# Patient Record
Sex: MALE | Race: WHITE | NOT HISPANIC OR LATINO | Employment: FULL TIME | URBAN - METROPOLITAN AREA
[De-identification: names, ages, dates, MRNs, and addresses within clinical notes are randomized per-mention and may not be internally consistent; named-entity substitution may affect disease eponyms.]

---

## 2017-03-01 ENCOUNTER — HOSPITAL ENCOUNTER (EMERGENCY)
Facility: HOSPITAL | Age: 27
Discharge: HOME | End: 2017-03-01
Attending: EMERGENCY MEDICINE
Payer: COMMERCIAL

## 2017-03-01 VITALS
OXYGEN SATURATION: 98 % | TEMPERATURE: 99 F | HEART RATE: 77 BPM | WEIGHT: 262.81 LBS | HEIGHT: 72 IN | DIASTOLIC BLOOD PRESSURE: 61 MMHG | RESPIRATION RATE: 18 BRPM | BODY MASS INDEX: 35.6 KG/M2 | SYSTOLIC BLOOD PRESSURE: 127 MMHG

## 2017-03-01 DIAGNOSIS — S00.33XA NASAL CONTUSION: Primary | ICD-10-CM

## 2017-03-01 DIAGNOSIS — S09.90XA HEAD TRAUMA: ICD-10-CM

## 2017-03-01 DIAGNOSIS — R11.0 NAUSEA: ICD-10-CM

## 2017-03-01 LAB
ALBUMIN SERPL BCP-MCNC: 4.2 G/DL
ALP SERPL-CCNC: 50 U/L
ALT SERPL W/O P-5'-P-CCNC: 38 U/L
AMPHET+METHAMPHET UR QL: NEGATIVE
ANION GAP SERPL CALC-SCNC: 12 MMOL/L
AST SERPL-CCNC: 24 U/L
BARBITURATES UR QL SCN>200 NG/ML: NEGATIVE
BASOPHILS # BLD AUTO: 0.03 K/UL
BASOPHILS NFR BLD: 0.3 %
BENZODIAZ UR QL SCN>200 NG/ML: NEGATIVE
BILIRUB SERPL-MCNC: 0.5 MG/DL
BILIRUB UR QL STRIP: NEGATIVE
BUN SERPL-MCNC: 16 MG/DL
BZE UR QL SCN: NEGATIVE
CALCIUM SERPL-MCNC: 9.4 MG/DL
CANNABINOIDS UR QL SCN: NEGATIVE
CHLORIDE SERPL-SCNC: 101 MMOL/L
CLARITY UR REFRACT.AUTO: CLEAR
CO2 SERPL-SCNC: 25 MMOL/L
COLOR UR AUTO: NORMAL
CREAT SERPL-MCNC: 1.2 MG/DL
CREAT UR-MCNC: 38.7 MG/DL
DIFFERENTIAL METHOD: ABNORMAL
EOSINOPHIL # BLD AUTO: 0.3 K/UL
EOSINOPHIL NFR BLD: 3.2 %
ERYTHROCYTE [DISTWIDTH] IN BLOOD BY AUTOMATED COUNT: 13.2 %
EST. GFR  (AFRICAN AMERICAN): >60 ML/MIN/1.73 M^2
EST. GFR  (NON AFRICAN AMERICAN): >60 ML/MIN/1.73 M^2
ETHANOL SERPL-MCNC: <10 MG/DL
GLUCOSE SERPL-MCNC: 95 MG/DL
GLUCOSE UR QL STRIP: NEGATIVE
HCT VFR BLD AUTO: 44.1 %
HGB BLD-MCNC: 15.5 G/DL
HGB UR QL STRIP: NEGATIVE
KETONES UR QL STRIP: NEGATIVE
LEUKOCYTE ESTERASE UR QL STRIP: NEGATIVE
LYMPHOCYTES # BLD AUTO: 2.4 K/UL
LYMPHOCYTES NFR BLD: 25.3 %
MCH RBC QN AUTO: 30.1 PG
MCHC RBC AUTO-ENTMCNC: 35.1 %
MCV RBC AUTO: 86 FL
METHADONE UR QL SCN>300 NG/ML: NEGATIVE
MONOCYTES # BLD AUTO: 1.3 K/UL
MONOCYTES NFR BLD: 13.2 %
NEUTROPHILS # BLD AUTO: 5.5 K/UL
NEUTROPHILS NFR BLD: 57.9 %
NITRITE UR QL STRIP: NEGATIVE
OPIATES UR QL SCN: NEGATIVE
PCP UR QL SCN>25 NG/ML: NEGATIVE
PH UR STRIP: 7 [PH] (ref 5–8)
PLATELET # BLD AUTO: 312 K/UL
PMV BLD AUTO: 10.3 FL
POTASSIUM SERPL-SCNC: 3.9 MMOL/L
PROT SERPL-MCNC: 7.8 G/DL
PROT UR QL STRIP: NEGATIVE
RBC # BLD AUTO: 5.15 M/UL
SODIUM SERPL-SCNC: 138 MMOL/L
SP GR UR STRIP: <=1.005 (ref 1–1.03)
TOXICOLOGY INFORMATION: NORMAL
URN SPEC COLLECT METH UR: NORMAL
UROBILINOGEN UR STRIP-ACNC: NEGATIVE EU/DL
WBC # BLD AUTO: 9.47 K/UL

## 2017-03-01 PROCEDURE — 96372 THER/PROPH/DIAG INJ SC/IM: CPT

## 2017-03-01 PROCEDURE — 81003 URINALYSIS AUTO W/O SCOPE: CPT

## 2017-03-01 PROCEDURE — 25000003 PHARM REV CODE 250: Performed by: EMERGENCY MEDICINE

## 2017-03-01 PROCEDURE — 80053 COMPREHEN METABOLIC PANEL: CPT

## 2017-03-01 PROCEDURE — 80320 DRUG SCREEN QUANTALCOHOLS: CPT

## 2017-03-01 PROCEDURE — 82570 ASSAY OF URINE CREATININE: CPT

## 2017-03-01 PROCEDURE — 63600175 PHARM REV CODE 636 W HCPCS: Performed by: EMERGENCY MEDICINE

## 2017-03-01 PROCEDURE — 85025 COMPLETE CBC W/AUTO DIFF WBC: CPT

## 2017-03-01 PROCEDURE — 99284 EMERGENCY DEPT VISIT MOD MDM: CPT | Mod: 25

## 2017-03-01 RX ORDER — ACETAMINOPHEN 325 MG/1
975 TABLET ORAL
Status: COMPLETED | OUTPATIENT
Start: 2017-03-01 | End: 2017-03-01

## 2017-03-01 RX ORDER — ONDANSETRON 4 MG/1
4 TABLET, FILM COATED ORAL EVERY 8 HOURS PRN
Qty: 12 TABLET | Refills: 0 | Status: SHIPPED | OUTPATIENT
Start: 2017-03-01

## 2017-03-01 RX ORDER — NAPROXEN 500 MG/1
500 TABLET ORAL 2 TIMES DAILY WITH MEALS
Qty: 20 TABLET | Refills: 0 | Status: SHIPPED | OUTPATIENT
Start: 2017-03-01

## 2017-03-01 RX ORDER — ONDANSETRON 2 MG/ML
8 INJECTION INTRAMUSCULAR; INTRAVENOUS
Status: COMPLETED | OUTPATIENT
Start: 2017-03-01 | End: 2017-03-01

## 2017-03-01 RX ADMIN — ONDANSETRON 8 MG: 2 INJECTION INTRAMUSCULAR; INTRAVENOUS at 10:03

## 2017-03-01 RX ADMIN — ACETAMINOPHEN 975 MG: 325 TABLET ORAL at 10:03

## 2017-03-01 NOTE — ED AVS SNAPSHOT
[facility]  [address] [address] [address]               Ashish Andrade   3/1/2017  9:18 PM   ED    Description:  Male : 1990   Department:  [facility]           Your Care was Coordinated By:     Provider Role From To    Praveen Machado Jr., MD Attending Provider 17 --      Reason for Visit     Fatigue     Nausea           Diagnoses this Visit        Comments    Nasal contusion    -  Primary     Head trauma         Nausea           ED Disposition     ED Disposition Condition Comment    Discharge  Regarding FACIAL/NASAL CONTUSIONS, I advised patient to: REST the injured area or use it less than usual; apply ICE to decrease swelling and pain and help prevent tissue damage; use COMPRESSION with an elastic bandage to support the area and decrease swe lling; ELEVATE injured body part above the level of the heart to help decrease pain and swelling; AVOID using massage or massage to acute injuries as it may slow healing of the area; AVOID drinking alcohol as it may slow healing of the injury; and avoid  stretching injured muscles. Advised patient to return to the emergency department or contact primary care provider if: having trouble moving injured area; notice tingling or numbness in or near the injured area; extremity below the bruise gets cold or tu rns pale; a new lump develops in the injured area; symptoms do not improve with treatment after 4 to 5 days; there is any questions or concerns about the condition or treatment plan.              To Do List           Follow-up Information     Follow up with Ana Greco NP. Schedule an appointment as soon as possible for a visit in 1 week.    Specialty:  Family Medicine    Contact information:    [address] [address] [address]  [phone]          Follow up with [facility].    Specialty:  Emergency Medicine    Why:  As needed, If symptoms worsen    Contact  information:    [address] [address] [id number] [phone]       These Medications        Disp Refills Start End    naproxen (NAPROSYN) 500 MG tablet 20 tablet 0 3/1/2017     Take 1 tablet (500 mg total) by mouth 2 (two) times daily with meals. - Oral    Pharmacy: [facility] [address] [place] [address] SO. Ph #: [phone]       ondansetron (ZOFRAN) 4 MG tablet 12 tablet 0 3/1/2017     Take 1 tablet (4 mg total) by mouth every 8 (eight) hours as needed. - Oral    Pharmacy: [place] [address] [facility] SO. Ph #: [phone]         [facility] On [facility]     [facility] On Call Nurse Care Line - 24/7 Assistance  Registered nurses in the [facility] provide clinical advisement, health education, appointment booking, and other advisory services.  Call for this free service at [phone].             Medications           Message regarding Medications     Verify the changes and/or additions to your medication regime listed below are the same as discussed with your clinician today.  If any of these changes or additions are incorrect, please notify your healthcare provider.        START taking these NEW medications        Refills    naproxen (NAPROSYN) 500 MG tablet 0    Sig: Take 1 tablet (500 mg total) by mouth 2 (two) times daily with meals.    Class: Print    Route: Oral    ondansetron (ZOFRAN) 4 MG tablet 0    Sig: Take 1 tablet (4 mg total) by mouth every 8 (eight) hours as needed.    Class: Print    Route: Oral      These medications were administered today        Dose Freq    ondansetron injection 8 mg 8 mg ED 1 Time    Sig: Inject 8 mg into the vein ED 1 Time.    Class: Normal    Route: Intravenous    acetaminophen tablet 975 mg 975 mg ED 1 Time    Sig: Take 3 tablets (975 mg total) by mouth ED 1 Time.    Class: Normal    Route: Oral           Verify that the below list of medications is an accurate representation of the medications you  are currently taking.  If none reported, the list may be blank. If incorrect, please contact your healthcare provider. Carry this list with you in case of emergency.           Current Medications     olmesartan (BENICAR) 20 MG tablet Take 1 tablet (20 mg total) by mouth once daily.    naproxen (NAPROSYN) 500 MG tablet Take 1 tablet (500 mg total) by mouth 2 (two) times daily with meals.    ondansetron (ZOFRAN) 4 MG tablet Take 1 tablet (4 mg total) by mouth every 8 (eight) hours as needed.           Clinical Reference Information           Your Vitals Were     BP                   140/79 (BP Location: Left arm, Patient Position: Sitting)           Allergies as of 3/1/2017     No Known Allergies      Immunizations Administered on Date of Encounter - 3/1/2017     None      ED Micro, Lab, POCT     Start Ordered       Status Ordering Provider    03/01/17 2148 03/01/17 2147  Ethanol  STAT      Final result     03/01/17 2125 03/01/17 2124    STAT,   Status:  Canceled      Canceled     03/01/17 2125 03/01/17 2124  Urinalysis  STAT      Final result     03/01/17 2125 03/01/17 2124  Drug screen panel, emergency  STAT      Final result     03/01/17 2125 03/01/17 2124  CBC auto differential  STAT      Final result     03/01/17 2125 03/01/17 2124  Comprehensive metabolic panel  STAT      Final result       ED Imaging Orders     Start Ordered       Status Ordering Provider    03/01/17 2125 03/01/17 2124  CT Maxillofacial Without Contrast  1 time imaging      Final result     03/01/17 2125 03/01/17 2124  CT Cervical Spine Without Contrast  1 time imaging      Final result     03/01/17 2124 03/01/17 2124  CT Head Without Contrast  1 time imaging      Final result         Discharge Instructions         First Aid: Head Injuries  A strong blow to the head may cause swelling and bleeding inside the skull. The resulting pressure can injure the brain (concussion). If you have any doubts identifying a concussion, have a healthcare  provider check the victim.  Seek medical help if any of the following is true:  · The victim loses consciousness.  · The victim has convulsions or seizures.  · The victim has unequal pupil size (the black part in the center of th eye is bigger one one side than the other)  · The victim shows any of the following signs of concussion:  ¨ confusion or inability to follow normal conversation  ¨ slurred speech  ¨ dizziness or vision problems  ¨ nausea or vomiting  ¨ muscle weakness or loss of mobility  ¨ memory loss  ¨ sensitivity to noise  ¨ fatigue  Call 911 immediately if the victim has any of the following:  · Prolonged loss of consciousness  · A depressed or spongy area in the skull, or visible bone fragments  · Clear fluid draining out of  the ears or nose  While you wait for help:  1. Reassure the person.  2. Treat for shock by maintaining body temperature and keeping the victim calm.  3. Provide rescue breathing or CPR, if needed.  4. If the person has neck or back pain or is unconscious, he or she might have a spine fracture. They should only  be moved with great precaution and if it is absolutely necessary.   Step 1: Control bleeding  · Apply direct pressure to control bleeding. (Wear gloves or use other protection to avoid contact with victim's blood.)  · Wash a minor surface injury with soap and water after the bleeding stops or is reduced.  · Cover the wound with a clean dressing and bandage.  Step 2: Ice bumps and bruises  · Place a cold pack or ice on the injury to reduce swelling and pain. Placing a cloth between the injury and the ice pack helps prevent tissue damage from severe cold.  Step 3: Observe the victim  · Watch for vomiting or changes in mood or alertness. If you notice changes, call for medical help. Signs of concussion may not appear for up to 48 hours.  · Tell the person's partner, parent, or roommate about the injury so he or she can continue to observe the victim.  Stitches  If a cut is  deep or continues to bleed, or the edges of skin do not stay together evenly, the wound may need to be closed with stitches, tape, staples, or medical glue. All can help speed healing and reduce the risk of infection and the size of the scar. These may be especially important concerns with large wounds, and wounds on the head or other visible body parts.  If you think a wound may need medical care, visit a health care professional as soon as possible. If stitches are needed, they must be applied in the first few hours. A wound that is not properly closed is at risk of serious infection.  Date Last Reviewed: 10/19/2015  © 3606-0720 [employer]. [address] [place] [address], [place] [address]. All rights reserved. This information is not intended as a substitute for professional medical care. Always follow your healthcare professional's instructions.          Bruises (Contusions)    A contusion is a bruise. A bruise happens when a blow to your body doesn't break the skin but does break blood vessels beneath the skin. Blood leaking from the broken vessels causes redness and swelling. As it heals, your bruise is likely to turn colors like purple, green, and yellow. This is normal. The bruise should fade in 2 or 3 weeks.  Factors that make you more likely to bruise  Almost everyone bruises now and then. Certain people do bruise more easily than others. You're more prone to bruising as you get older. That's because blood vessels become more fragile with age. You're also more likely to bruise if you have a clotting disorder such as hemophilia or take medications that reduce clotting, including aspirin.  When to go to the emergency room (ER)  Bruises almost always heal on their own without special treatment. But for some people, a bad bruise can be serious. Seek medical care if you:  · Have a clotting disorder such as hemophilia.  · Have cirrhosis or other serious liver disease.  · Take blood-thinning medications  such as warfarin (Coumadin).  What to expect in the ER  A doctor will examine your bruise and ask about any health conditions you have. In some cases, you may have a test to check how well your blood clots. Other treatment will depend on your needs.  Follow-up care  Sometimes a bruise gets worse instead of better. It may become larger and more swollen. This can occur when your body walls off a small pool of blood under the skin (hematoma). In very rare cases, your doctor may need to drain excess blood from the area.  Tip:  Apply an ice pack or bag of frozen peas to a bruise (keep a thin cloth between the cold source and your skin). This can help reduce redness and swelling.   Date Last Reviewed: 11/30/2014  © 9003-2595 [employer]. [address] [place] [address], [place] [address]. All rights reserved. This information is not intended as a substitute for professional medical care. Always follow your healthcare professional's instructions.          Nasal Contusion  Your nose has bruising (contusion). You dont appear to have any broken bones. A contusion may cause pain, swelling, and stuffiness of the nose. You may also have bleeding.  Home care  · To ease pain and swelling, wrap a bag of ice, cold pack, or frozen peas in a thin towel. Place the cold source on your nose for 10 minutes at a time. Do this every 2 hours during the first 24 hours. Then continue 4 times a day for the next 2 days.  · Take pain medicines as directed. Talk with your healthcare provider before taking ibuprofen to help control pain.  · Tell the healthcare provider if you are taking aspirin or blood thinners.  · Don't blow your nose for the first 2 days. After this, blow your nose gently. This helps prevent new bleeding.  · Dont drink alcohol or hot liquids for the next 2 days. These can dilate blood vessels in your nose and cause bleeding.  · Sleep with your head elevated for a couple of days until the swelling and pain being to  lessen.  · Avoid any activity that could result in another head injury until you are given the OK to do so.   Note about concussion  Because the injury was to your head, it is possible that a concussion (mild brain injury) could result. You don't have signs of a concussion at this time. But symptoms can show up later. Be alert for signs and symptoms of a concussion. Seek emergency medical care if any of these develop over the next hours to days:  · Headache  · Nausea or vomiting  · Dizziness  · Sensitivity to light or noise  · Unusual sleepiness or grogginess  · Trouble falling asleep  · Personality changes  · Vision changes  · Memory loss  · Confusion  · Trouble walking or clumsiness  · Loss of consciousness (even for a short time)  · Inability to be awakened   Follow-up care  Follow up with your doctor, or as advised. If you have been referred to a specialist, make an appointment within 3-5 days of the injury.  When to seek medical advice  Call your healthcare provider right away if any of these occur:  · Bleeding from your nose that won't stop  · Your nose looks crooked  · You cannot breathe through one or both sides of your nose  · Facial swelling, pain, or redness that gets worse  · Fever of 100.4ºF (38ºC)  · Pus or clear discharge from your nose  · Skin on the nose is split open or has a gap  · Sinus pain  Date Last Reviewed: 4/13/2015  © 2765-3806 [employer]. [address] [place] [address]. All rights reserved. This information is not intended as a substitute for professional medical care. Always follow your healthcare professional's instructions.          Soft Tissue Contusion  You have a contusion. This is also called a bruise. There is swelling and some bleeding under the skin. This injury generally takes a few days to a few weeks to heal.  During that time, the bruise will typically change in color from reddish, to purple-blue, to greenish-yellow, then to yellow-brown.  Home  care  · Elevate the injured area to reduce pain and swelling. As much as possible, sit or lie down with the injured area raised about the level of your heart. This is especially important during the first 48 hours.  · Ice the injured area to help reduce pain and swelling. Wrap a cold source (ice pack or ice cubes in a plastic bag) in a thin towel. Apply to the bruised area for 20 minutes every 1 to 2 hours the first day. Continue this 3 to 4 times a day until the pain and swelling goes away.  · Unless another medication was prescribed, you can take acetaminophen, ibuprofen, or naproxen to control pain. (If you have chronic liver or kidney disease or ever had a stomach ulcer or GI bleeding, talk with your doctor before using these medicines.)  Follow up  Follow up with your health care provider or our staff as advised. Call if you are not better in 1 to 2 weeks.  When to seek medical advice   Call your health care provider right away if you have any of the following:  · Increased pain or swelling  · Bruise is on an arm or leg and arm or leg becomes cold, blue, numb or tingly  · Signs of infection: Warmth, drainage, or increased redness or pain around the contusion  · Inability to move the injured area or body part   · Bruise is near your eye and you have problems with your eyesight or eye   · Frequent bruising for unknown reasons  Date Last Reviewed: 4/29/2015 © 2000-2016 [employer]. [address] [place] [address]. All rights reserved. This information is not intended as a substitute for professional medical care. Always follow your healthcare professional's instructions.          Discharge References/Attachments     FACIAL CONTUSION WITH SLEEP MONITORING (ENGLISH)      Smoking Cessation     If you would like to quit smoking:   You may be eligible for free services if you are a [place] resident and started smoking cigarettes before September 1, 1988.  Call the Smoking Cessation Trust  (SCT) toll free at (001) 255-1252 or (818) 512-3985.   Call 1-800-QUIT-NOW if you do not meet the above criteria.             [facility] complies with applicable Federal civil rights laws and does not discriminate on the basis of race, color, national origin, age, disability, or sex.        Language Assistance Services     ATTENTION: Language assistance services are available, free of charge. Please call [phone].      ATENCIÓN: Si habla bill, tiene a parisi disposición servicios gratuitos de asistencia lingüística. Llame al [phone].     CHÚ Ý: N?u b?n nói Ti?ng Vi?t, có các d?ch v? h? tr? ngôn ng? mi?n phí dành cho b?n. G?i s? [phone].
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
## 2017-03-02 NOTE — DISCHARGE INSTRUCTIONS
First Aid: Head Injuries  A strong blow to the head may cause swelling and bleeding inside the skull. The resulting pressure can injure the brain (concussion). If you have any doubts identifying a concussion, have a healthcare provider check the victim.  Seek medical help if any of the following is true:  · The victim loses consciousness.  · The victim has convulsions or seizures.  · The victim has unequal pupil size (the black part in the center of th eye is bigger one one side than the other)  · The victim shows any of the following signs of concussion:  ¨ confusion or inability to follow normal conversation  ¨ slurred speech  ¨ dizziness or vision problems  ¨ nausea or vomiting  ¨ muscle weakness or loss of mobility  ¨ memory loss  ¨ sensitivity to noise  ¨ fatigue  Call 911 immediately if the victim has any of the following:  · Prolonged loss of consciousness  · A depressed or spongy area in the skull, or visible bone fragments  · Clear fluid draining out of  the ears or nose  While you wait for help:  1. Reassure the person.  2. Treat for shock by maintaining body temperature and keeping the victim calm.  3. Provide rescue breathing or CPR, if needed.  4. If the person has neck or back pain or is unconscious, he or she might have a spine fracture. They should only  be moved with great precaution and if it is absolutely necessary.   Step 1: Control bleeding  · Apply direct pressure to control bleeding. (Wear gloves or use other protection to avoid contact with victim's blood.)  · Wash a minor surface injury with soap and water after the bleeding stops or is reduced.  · Cover the wound with a clean dressing and bandage.  Step 2: Ice bumps and bruises  · Place a cold pack or ice on the injury to reduce swelling and pain. Placing a cloth between the injury and the ice pack helps prevent tissue damage from severe cold.  Step 3: Observe the victim  · Watch for vomiting or changes in mood or alertness. If you notice  changes, call for medical help. Signs of concussion may not appear for up to 48 hours.  · Tell the person's partner, parent, or roommate about the injury so he or she can continue to observe the victim.  Stitches  If a cut is deep or continues to bleed, or the edges of skin do not stay together evenly, the wound may need to be closed with stitches, tape, staples, or medical glue. All can help speed healing and reduce the risk of infection and the size of the scar. These may be especially important concerns with large wounds, and wounds on the head or other visible body parts.  If you think a wound may need medical care, visit a health care professional as soon as possible. If stitches are needed, they must be applied in the first few hours. A wound that is not properly closed is at risk of serious infection.  Date Last Reviewed: 10/19/2015  © 7820-8358 [employer]. [address] [place] [address]. All rights reserved. This information is not intended as a substitute for professional medical care. Always follow your healthcare professional's instructions.          Bruises (Contusions)    A contusion is a bruise. A bruise happens when a blow to your body doesn't break the skin but does break blood vessels beneath the skin. Blood leaking from the broken vessels causes redness and swelling. As it heals, your bruise is likely to turn colors like purple, green, and yellow. This is normal. The bruise should fade in 2 or 3 weeks.  Factors that make you more likely to bruise  Almost everyone bruises now and then. Certain people do bruise more easily than others. You're more prone to bruising as you get older. That's because blood vessels become more fragile with age. You're also more likely to bruise if you have a clotting disorder such as hemophilia or take medications that reduce clotting, including aspirin.  When to go to the emergency room (ER)  Bruises almost always heal on their own without  special treatment. But for some people, a bad bruise can be serious. Seek medical care if you:  · Have a clotting disorder such as hemophilia.  · Have cirrhosis or other serious liver disease.  · Take blood-thinning medications such as warfarin (Coumadin).  What to expect in the ER  A doctor will examine your bruise and ask about any health conditions you have. In some cases, you may have a test to check how well your blood clots. Other treatment will depend on your needs.  Follow-up care  Sometimes a bruise gets worse instead of better. It may become larger and more swollen. This can occur when your body walls off a small pool of blood under the skin (hematoma). In very rare cases, your doctor may need to drain excess blood from the area.  Tip:  Apply an ice pack or bag of frozen peas to a bruise (keep a thin cloth between the cold source and your skin). This can help reduce redness and swelling.   Date Last Reviewed: 11/30/2014  © 4205-9853 [employer]. [address] [place] [address]. All rights reserved. This information is not intended as a substitute for professional medical care. Always follow your healthcare professional's instructions.          Nasal Contusion  Your nose has bruising (contusion). You dont appear to have any broken bones. A contusion may cause pain, swelling, and stuffiness of the nose. You may also have bleeding.  Home care  · To ease pain and swelling, wrap a bag of ice, cold pack, or frozen peas in a thin towel. Place the cold source on your nose for 10 minutes at a time. Do this every 2 hours during the first 24 hours. Then continue 4 times a day for the next 2 days.  · Take pain medicines as directed. Talk with your healthcare provider before taking ibuprofen to help control pain.  · Tell the healthcare provider if you are taking aspirin or blood thinners.  · Don't blow your nose for the first 2 days. After this, blow your nose gently. This helps prevent new  bleeding.  · Dont drink alcohol or hot liquids for the next 2 days. These can dilate blood vessels in your nose and cause bleeding.  · Sleep with your head elevated for a couple of days until the swelling and pain being to lessen.  · Avoid any activity that could result in another head injury until you are given the OK to do so.   Note about concussion  Because the injury was to your head, it is possible that a concussion (mild brain injury) could result. You don't have signs of a concussion at this time. But symptoms can show up later. Be alert for signs and symptoms of a concussion. Seek emergency medical care if any of these develop over the next hours to days:  · Headache  · Nausea or vomiting  · Dizziness  · Sensitivity to light or noise  · Unusual sleepiness or grogginess  · Trouble falling asleep  · Personality changes  · Vision changes  · Memory loss  · Confusion  · Trouble walking or clumsiness  · Loss of consciousness (even for a short time)  · Inability to be awakened   Follow-up care  Follow up with your doctor, or as advised. If you have been referred to a specialist, make an appointment within 3-5 days of the injury.  When to seek medical advice  Call your healthcare provider right away if any of these occur:  · Bleeding from your nose that won't stop  · Your nose looks crooked  · You cannot breathe through one or both sides of your nose  · Facial swelling, pain, or redness that gets worse  · Fever of 100.4ºF (38ºC)  · Pus or clear discharge from your nose  · Skin on the nose is split open or has a gap  · Sinus pain  Date Last Reviewed: 4/13/2015 © 2000-2016 [employer]. [address] [place] [address], [place] [address]. All rights reserved. This information is not intended as a substitute for professional medical care. Always follow your healthcare professional's instructions.          Soft Tissue Contusion  You have a contusion. This is also called a bruise. There is swelling and some  bleeding under the skin. This injury generally takes a few days to a few weeks to heal.  During that time, the bruise will typically change in color from reddish, to purple-blue, to greenish-yellow, then to yellow-brown.  Home care  · Elevate the injured area to reduce pain and swelling. As much as possible, sit or lie down with the injured area raised about the level of your heart. This is especially important during the first 48 hours.  · Ice the injured area to help reduce pain and swelling. Wrap a cold source (ice pack or ice cubes in a plastic bag) in a thin towel. Apply to the bruised area for 20 minutes every 1 to 2 hours the first day. Continue this 3 to 4 times a day until the pain and swelling goes away.  · Unless another medication was prescribed, you can take acetaminophen, ibuprofen, or naproxen to control pain. (If you have chronic liver or kidney disease or ever had a stomach ulcer or GI bleeding, talk with your doctor before using these medicines.)  Follow up  Follow up with your health care provider or our staff as advised. Call if you are not better in 1 to 2 weeks.  When to seek medical advice   Call your health care provider right away if you have any of the following:  · Increased pain or swelling  · Bruise is on an arm or leg and arm or leg becomes cold, blue, numb or tingly  · Signs of infection: Warmth, drainage, or increased redness or pain around the contusion  · Inability to move the injured area or body part   · Bruise is near your eye and you have problems with your eyesight or eye   · Frequent bruising for unknown reasons  Date Last Reviewed: 4/29/2015 © 2000-2016 [employer]. [address] [place] [address], [place] [address]. All rights reserved. This information is not intended as a substitute for professional medical care. Always follow your healthcare professional's instructions.

## 2017-12-20 ENCOUNTER — HOSPITAL ENCOUNTER (OUTPATIENT)
Dept: CARDIOLOGY | Facility: CLINIC | Age: 27
Discharge: HOME | End: 2017-12-20
Payer: COMMERCIAL

## 2017-12-20 ENCOUNTER — OFFICE VISIT (OUTPATIENT)
Dept: INTERNAL MEDICINE | Facility: CLINIC | Age: 27
End: 2017-12-20
Payer: COMMERCIAL

## 2017-12-20 VITALS
TEMPERATURE: 99 F | BODY MASS INDEX: 37.71 KG/M2 | OXYGEN SATURATION: 98 % | HEIGHT: 72 IN | SYSTOLIC BLOOD PRESSURE: 142 MMHG | DIASTOLIC BLOOD PRESSURE: 72 MMHG | RESPIRATION RATE: 18 BRPM | HEART RATE: 93 BPM | WEIGHT: 278.44 LBS

## 2017-12-20 DIAGNOSIS — R07.9 CHEST PAIN, UNSPECIFIED TYPE: ICD-10-CM

## 2017-12-20 DIAGNOSIS — R42 DIZZINESS: Primary | ICD-10-CM

## 2017-12-20 LAB
FLUAV AG SPEC QL IA: NEGATIVE
FLUBV AG SPEC QL IA: NEGATIVE
SPECIMEN SOURCE: NORMAL

## 2017-12-20 PROCEDURE — 87400 INFLUENZA A/B EACH AG IA: CPT | Mod: 59,PO

## 2017-12-20 PROCEDURE — 93010 ELECTROCARDIOGRAM REPORT: CPT | Mod: S$GLB,,, | Performed by: INTERNAL MEDICINE

## 2017-12-20 PROCEDURE — 99999 PR PBB SHADOW E&M-EST. PATIENT-LVL IV: CPT | Mod: PBBFAC,,, | Performed by: NURSE PRACTITIONER

## 2017-12-20 PROCEDURE — 99214 OFFICE O/P EST MOD 30 MIN: CPT | Mod: S$GLB,,, | Performed by: NURSE PRACTITIONER

## 2017-12-20 NOTE — PROGRESS NOTES
Subjective:   Patient ID:  Ashish Andrade is a 27 y.o. male.  Chief Complaint:  Shortness of Breath; Nausea; and Dizziness    Past Medical History:   Diagnosis Date    Hypertension      Past Surgical History:   Procedure Laterality Date    ANKLE SURGERY      total of  2 Sx    WISDOM TOOTH EXTRACTION       History reviewed. No pertinent family history.  Review of patient's allergies indicates:  No Known Allergies  Current Outpatient Prescriptions   Medication Sig Dispense Refill    olmesartan (BENICAR) 20 MG tablet Take 1 tablet (20 mg total) by mouth once daily. 30 tablet 5    naproxen (NAPROSYN) 500 MG tablet Take 1 tablet (500 mg total) by mouth 2 (two) times daily with meals. 20 tablet 0    ondansetron (ZOFRAN) 4 MG tablet Take 1 tablet (4 mg total) by mouth every 8 (eight) hours as needed. 12 tablet 0     No current facility-administered medications for this visit.      Mr. Flynn presents today with somewhat vague symptoms.  He is was dizzy, diaphoretic, shaky, had some mild chest discomfort, nauseous, and somewhat nervous.      Dizziness:   Chronicity:  New  Onset:  Today  Progression since onset:  Gradually improving  Severity:  Moderate  Dizziness characteristics:  Lightheaded/impending faint  Initial Spell Date and Length:  20 minutes   Associated symptoms: nausea, diaphoresis, weakness and light-headedness.no hearing loss, no ear congestion, no ear pain, no fever, no headaches, no tinnitus, no vomiting, no aural fullness, no visual disturbances, no syncope, no palpitations, no panic, no facial weakness, no slurred speech, no numbness in extremities and no chest pain.  Aggravated by:  Nothing  Treatments tried:  Rest  Improvements on treatment:  Mild    Review of Systems   Constitutional: Positive for diaphoresis. Negative for activity change, chills, fatigue and fever.   HENT: Negative for congestion, dental problem, ear pain, hearing loss, postnasal drip, rhinorrhea, sinus pressure, tinnitus  and trouble swallowing.    Eyes: Negative for pain, discharge and visual disturbance.   Respiratory: Positive for chest tightness and shortness of breath. Negative for cough and wheezing.    Cardiovascular: Negative for chest pain, palpitations, leg swelling and syncope.   Gastrointestinal: Positive for diarrhea and nausea. Negative for abdominal distention, abdominal pain, constipation and vomiting.   Endocrine: Negative for polydipsia, polyphagia and polyuria.   Genitourinary: Negative for decreased urine volume, dysuria, frequency and urgency.   Musculoskeletal: Negative for arthralgias, joint swelling and myalgias.   Skin: Negative for rash and wound.   Neurological: Positive for dizziness, weakness and light-headedness. Negative for numbness and headaches.   Hematological: Negative for adenopathy.   Psychiatric/Behavioral: Negative for dysphoric mood, sleep disturbance and suicidal ideas. The patient is not nervous/anxious.         He does have a stressful job that he started about 3 months ago.       Objective:   BP (!) 142/72 (BP Location: Right arm, Patient Position: Sitting, BP Method: X-Large (Manual))   Pulse 93   Temp 98.7 °F (37.1 °C) (Tympanic)   Resp 18   Ht 6' (1.829 m)   Wt 126.3 kg (278 lb 7.1 oz)   SpO2 98%   BMI 37.76 kg/m²   Physical Exam   Constitutional: He is oriented to person, place, and time. He appears well-developed. He appears distressed.   Obese   HENT:   Head: Normocephalic and atraumatic.   Right Ear: Hearing, tympanic membrane, external ear and ear canal normal.   Left Ear: Hearing, tympanic membrane, external ear and ear canal normal.   Nose: Nose normal. No mucosal edema, rhinorrhea, sinus tenderness or nasal deformity.   Mouth/Throat: Oropharynx is clear and moist. No oral lesions. Normal dentition. No oropharyngeal exudate, posterior oropharyngeal edema or posterior oropharyngeal erythema.   Eyes: Conjunctivae, EOM and lids are normal. Pupils are equal, round, and  reactive to light.   Neck: Normal range of motion and full passive range of motion without pain. Neck supple. No JVD present. No tracheal deviation present. No thyromegaly present.   Cardiovascular: Normal rate, regular rhythm, S1 normal, S2 normal, normal heart sounds and intact distal pulses.    No murmur heard.  Pulmonary/Chest: Effort normal and breath sounds normal. No stridor. No respiratory distress.   Abdominal: Soft. Normal appearance and bowel sounds are normal. He exhibits no distension. There is no tenderness.   Musculoskeletal: Normal range of motion. He exhibits no edema or tenderness.   Lymphadenopathy:     He has no cervical adenopathy.   Neurological: He is alert and oriented to person, place, and time. He has normal reflexes.   Skin: Skin is warm and intact. No rash noted. He is diaphoretic. No cyanosis. Nails show no clubbing.   Psychiatric: He has a normal mood and affect. His speech is normal and behavior is normal. Judgment and thought content normal. Cognition and memory are normal.     Assessment:     1. Dizziness    2. Chest pain, unspecified type      Plan:   Dizziness  Problem presentation Mr. Andrade seemed very distressed.  However after waiting for approximately 30 minutes for results of flu swab.  The patient had calmed down a lot.  No longer look distress.  No longer diaphoretic.  With her there are proving it seems that this may have been related to anxiety.  Patient admits that he has recently taken a new job that is very stressful.  Works 12 hour shift, with little breaks.  Recommended that the patient take the rest of the day off for rest.  That he start utilizing stress management techniques.  As well as working to sleep more than 4 hours a night which is his current regimen.  -     Influenza antigen Nasal Swab    Chest pain, unspecified type  -     EKG 12-lead; Future; Expected date: 12/20/2017     Patient will follow-up if any further issues or symptoms return.

## 2017-12-20 NOTE — LETTER
December 20, 2017                 [facility]  Internal Medicine  [address] [place] [id number]  Phone: [phone]   December 20, 2017     Patient: Ashish Andrade   YOB: 1990   Date of Visit: 12/20/2017       To Whom it May Concern:    Ashish Andrade was seen in my clinic on 12/20/2017. He may return to work on 12/21/2017.    If you have any questions or concerns, please don't hesitate to call.    Sincerely,         SHIRA Jimenez,FNP-C

## 2017-12-20 NOTE — LETTER
December 20, 2017                 [facility] Internal Medicine  Internal Medicine  [address] [place] [id number]  Phone: [phone]   December 20, 2017     Patient: Ashish Andrade   YOB: 1990   Date of Visit: 12/20/2017       To Whom it May Concern:    Ashish Andrade was seen in my clinic on 12/20/2017. He may return to work on 12/20/2017.    If you have any questions or concerns, please don't hesitate to call.    Sincerely,         SHIRA Jimenez,FNP-C

## 2017-12-20 NOTE — PATIENT INSTRUCTIONS
Your Bodys Response to Anxiety    Normal anxiety is part of the bodys natural defense system. It's an alert to a threat that is unknown, vague, or comes from your own internal fears. While youre in this state, your feelings can range from a vague sense of worry to physical sensations such as a pounding heartbeat. These feelings make you want to react to the threat. An anxiety response is normal in many situations. But when you have an anxiety disorder, the same response can occur at the wrong times.  Anxiety can be helpful  Normal anxiety is a signal from your brain that warns you of a threat and is a normal response to help you prevent something or decrease the bad effects of something you can't control. For example, anxiety is a normal response to situations that might damage your body, separate you from a loved one, or lose your job. The symptoms of anxiety can be physical and mental.  How does it feel?  At certain times, people with anxiety may have:  · Dizziness  · Muscle tension or pain  · Restlessness  · Sleeplessness  · Trouble concentrating  · Racing heartbeat  · Fast breathing  · Shaking or trembling  · Stomachache  · Diarrhea  · Loss of energy  · Sweating  · Cold, clammy hands  · Chest pain  · Dry mouth  Anxiety can also be a problem  Anxiety can become a problem when it is hard to control, occurs for months, and interferes with important parts of your life. With an anxiety disorder, your body has the response described above, but in inappropriate ways. The response a person has depends on the anxiety disorder he or she has. With some disorders, the anxiety is way out of proportion to the threat that triggers it. With others, anxiety may occur even when there isnt a clear threat or trigger.  Who does it affect?  Some people are more prone to persistent anxiety than others. It tends to run in families, and it affects more younger people than older people, and more women than men. But no age, race, or  "gender is immune to anxiety problems.  Anxiety can be treated  The good news is that the anxiety thats disrupting your life can be treated. Check with your healthcare provider and rule out any physical problems that may be causing the anxiety symptoms. If an anxiety disorder is diagnosed seek mental healthcare. This is an illness and it can respond to treatment. Most types of anxiety disorders will respond to "talk therapy" and medicines. Working with your doctor or other healthcare provider, you can develop skills to help you cope with anxiety. You can also gain the perspective you need to overcome your fears. Note: Good sources of support or guidance can be found at your local hospital, mental health clinic, or an employee assistance program.  How to cope with anxiety  If anxiety is wearing you down, here are some things you can do to cope:  · Keep in mind that you cant control everything about a situation. Change what you can and let the rest take its course.  · Exercise--its a great way to relieve tension and help your body feel relaxed.  · Avoid caffeine and nicotine, which can make anxiety symptoms worse.  · Fight the temptation to turn to alcohol or unprescribed drugs for relief. They only make things worse in the long run.  · Educate yourself about anxiety disorders. Keep track of helpful online resources and books you can use during stressful periods.  · Try stress management techniques such as meditation.  · Consider online or in-person support groups.   Date Last Reviewed: 1/1/2017  © 6947-4729 [employer]. [address] [place] [address], [place] [address]. All rights reserved. This information is not intended as a substitute for professional medical care. Always follow your healthcare professional's instructions.        "